# Patient Record
Sex: FEMALE | Race: WHITE | NOT HISPANIC OR LATINO | URBAN - METROPOLITAN AREA
[De-identification: names, ages, dates, MRNs, and addresses within clinical notes are randomized per-mention and may not be internally consistent; named-entity substitution may affect disease eponyms.]

---

## 2023-06-06 LAB
BASOPHILS (10*3/UL) IN BLOOD BY AUTOMATED COUNT: 0.05 X10E9/L (ref 0–0.1)
BASOPHILS/100 LEUKOCYTES IN BLOOD BY AUTOMATED COUNT: 0.7 % (ref 0–2)
EOSINOPHILS (10*3/UL) IN BLOOD BY AUTOMATED COUNT: 0.08 X10E9/L (ref 0–0.7)
EOSINOPHILS/100 LEUKOCYTES IN BLOOD BY AUTOMATED COUNT: 1.1 % (ref 0–6)
ERYTHROCYTE DISTRIBUTION WIDTH (RATIO) BY AUTOMATED COUNT: 12.7 % (ref 11.5–14.5)
ERYTHROCYTE MEAN CORPUSCULAR HEMOGLOBIN CONCENTRATION (G/DL) BY AUTOMATED: 32.1 G/DL (ref 32–36)
ERYTHROCYTE MEAN CORPUSCULAR VOLUME (FL) BY AUTOMATED COUNT: 94 FL (ref 80–100)
ERYTHROCYTES (10*6/UL) IN BLOOD BY AUTOMATED COUNT: 4.89 X10E12/L (ref 4–5.2)
HEMATOCRIT (%) IN BLOOD BY AUTOMATED COUNT: 45.8 % (ref 36–46)
HEMOGLOBIN (G/DL) IN BLOOD: 14.7 G/DL (ref 12–16)
IMMATURE GRANULOCYTES/100 LEUKOCYTES IN BLOOD BY AUTOMATED COUNT: 0.4 % (ref 0–0.9)
LEUKOCYTES (10*3/UL) IN BLOOD BY AUTOMATED COUNT: 7 X10E9/L (ref 4.4–11.3)
LYMPHOCYTES (10*3/UL) IN BLOOD BY AUTOMATED COUNT: 1.37 X10E9/L (ref 1.2–4.8)
LYMPHOCYTES/100 LEUKOCYTES IN BLOOD BY AUTOMATED COUNT: 19.5 % (ref 13–44)
MONOCYTES (10*3/UL) IN BLOOD BY AUTOMATED COUNT: 0.53 X10E9/L (ref 0.1–1)
MONOCYTES/100 LEUKOCYTES IN BLOOD BY AUTOMATED COUNT: 7.6 % (ref 2–10)
NEUTROPHILS (10*3/UL) IN BLOOD BY AUTOMATED COUNT: 4.95 X10E9/L (ref 1.2–7.7)
NEUTROPHILS/100 LEUKOCYTES IN BLOOD BY AUTOMATED COUNT: 70.7 % (ref 40–80)
PLATELETS (10*3/UL) IN BLOOD AUTOMATED COUNT: 300 X10E9/L (ref 150–450)

## 2023-06-14 ENCOUNTER — HOSPITAL ENCOUNTER (OUTPATIENT)
Dept: DATA CONVERSION | Facility: HOSPITAL | Age: 53
End: 2023-06-14
Attending: ORTHOPAEDIC SURGERY | Admitting: ORTHOPAEDIC SURGERY
Payer: COMMERCIAL

## 2023-06-14 DIAGNOSIS — I11.0 HYPERTENSIVE HEART DISEASE WITH HEART FAILURE (MULTI): ICD-10-CM

## 2023-06-14 DIAGNOSIS — I50.9 HEART FAILURE, UNSPECIFIED (MULTI): ICD-10-CM

## 2023-06-14 DIAGNOSIS — Z85.3 PERSONAL HISTORY OF MALIGNANT NEOPLASM OF BREAST: ICD-10-CM

## 2023-06-14 DIAGNOSIS — Z79.82 LONG TERM (CURRENT) USE OF ASPIRIN: ICD-10-CM

## 2023-06-14 DIAGNOSIS — Z88.0 ALLERGY STATUS TO PENICILLIN: ICD-10-CM

## 2023-06-14 DIAGNOSIS — M17.11 UNILATERAL PRIMARY OSTEOARTHRITIS, RIGHT KNEE: ICD-10-CM

## 2023-06-14 DIAGNOSIS — S83.241A OTHER TEAR OF MEDIAL MENISCUS, CURRENT INJURY, RIGHT KNEE, INITIAL ENCOUNTER: ICD-10-CM

## 2023-09-07 VITALS — HEIGHT: 63 IN | BODY MASS INDEX: 22.66 KG/M2 | WEIGHT: 127.87 LBS

## 2023-09-26 PROBLEM — E03.8 HYPOTHYROIDISM DUE TO HASHIMOTO'S THYROIDITIS: Status: ACTIVE | Noted: 2020-09-23

## 2023-09-26 PROBLEM — D05.01 LOBULAR CARCINOMA IN SITU (LCIS) OF RIGHT BREAST: Status: ACTIVE | Noted: 2020-06-05

## 2023-09-26 PROBLEM — Z95.810 USES LIFEVEST DEFIBRILLATOR: Status: ACTIVE | Noted: 2021-04-30

## 2023-09-26 PROBLEM — T78.40XA ALLERGIES: Status: ACTIVE | Noted: 2023-09-26

## 2023-09-26 PROBLEM — D05.02 LOBULAR CARCINOMA IN SITU (LCIS) OF LEFT BREAST: Status: ACTIVE | Noted: 2023-09-26

## 2023-09-26 PROBLEM — N60.92 ATYPICAL LOBULAR HYPERPLASIA (ALH) OF LEFT BREAST: Status: ACTIVE | Noted: 2023-09-26

## 2023-09-26 PROBLEM — D05.00 BREAST NEOPLASM, TIS (LCIS): Status: ACTIVE | Noted: 2021-06-28

## 2023-09-26 PROBLEM — I50.22 CHRONIC SYSTOLIC (CONGESTIVE) HEART FAILURE (MULTI): Status: ACTIVE | Noted: 2022-11-09

## 2023-09-26 PROBLEM — E03.9 HYPOTHYROIDISM: Status: ACTIVE | Noted: 2023-09-26

## 2023-09-26 PROBLEM — R05.9 COUGH: Status: ACTIVE | Noted: 2023-09-26

## 2023-09-26 PROBLEM — Z86.16 HISTORY OF 2019 NOVEL CORONAVIRUS DISEASE (COVID-19): Status: ACTIVE | Noted: 2021-08-02

## 2023-09-26 PROBLEM — I50.9 HEART FAILURE (MULTI): Status: ACTIVE | Noted: 2023-09-26

## 2023-09-26 PROBLEM — J30.9 ALLERGIC RHINITIS: Status: ACTIVE | Noted: 2023-09-26

## 2023-09-26 PROBLEM — I42.8 NICM (NONISCHEMIC CARDIOMYOPATHY) (MULTI): Status: ACTIVE | Noted: 2021-02-01

## 2023-09-26 PROBLEM — E06.3 HYPOTHYROIDISM DUE TO HASHIMOTO'S THYROIDITIS: Status: ACTIVE | Noted: 2020-09-23

## 2023-09-26 PROBLEM — C50.911 MALIGNANT NEOPLASM OF RIGHT BREAST (MULTI): Status: ACTIVE | Noted: 2020-12-24

## 2023-09-26 RX ORDER — BUDESONIDE 0.5 MG/2ML
INHALANT ORAL
COMMUNITY
Start: 2018-12-20 | End: 2023-11-08 | Stop reason: WASHOUT

## 2023-09-26 RX ORDER — METAXALONE 800 MG/1
TABLET ORAL
COMMUNITY
Start: 2023-02-18 | End: 2023-11-08 | Stop reason: WASHOUT

## 2023-09-26 RX ORDER — DUPILUMAB 300 MG/2ML
INJECTION, SOLUTION SUBCUTANEOUS
COMMUNITY
Start: 2021-06-03 | End: 2023-11-08 | Stop reason: WASHOUT

## 2023-09-26 RX ORDER — LEVOTHYROXINE SODIUM 50 UG/1
TABLET ORAL
COMMUNITY
Start: 2020-10-07

## 2023-09-26 RX ORDER — AZELASTINE HYDROCHLORIDE 0.5 MG/ML
SOLUTION/ DROPS OPHTHALMIC
COMMUNITY
Start: 2023-05-19 | End: 2023-11-08 | Stop reason: WASHOUT

## 2023-09-26 RX ORDER — AZELASTINE 1 MG/ML
1 SPRAY, METERED NASAL DAILY PRN
COMMUNITY
Start: 2023-07-30 | End: 2023-11-02 | Stop reason: ALTCHOICE

## 2023-09-26 RX ORDER — TRAMADOL HYDROCHLORIDE 50 MG/1
TABLET ORAL EVERY 6 HOURS PRN
COMMUNITY
Start: 2018-01-25 | End: 2023-11-02 | Stop reason: ALTCHOICE

## 2023-09-26 RX ORDER — SPIRONOLACTONE 25 MG/1
25 TABLET ORAL
COMMUNITY
Start: 2021-04-30

## 2023-09-26 RX ORDER — ROSUVASTATIN CALCIUM 10 MG/1
10 TABLET, COATED ORAL
COMMUNITY
Start: 2022-11-09 | End: 2023-11-09

## 2023-09-26 RX ORDER — FUROSEMIDE 20 MG/1
20 TABLET ORAL
COMMUNITY
Start: 2023-02-22 | End: 2024-02-22

## 2023-09-26 RX ORDER — ONDANSETRON 4 MG/1
TABLET, FILM COATED ORAL
COMMUNITY
Start: 2023-06-14 | End: 2023-11-08 | Stop reason: WASHOUT

## 2023-09-26 RX ORDER — TAMOXIFEN CITRATE 10 MG/1
1 TABLET ORAL DAILY
COMMUNITY
End: 2023-11-02 | Stop reason: ALTCHOICE

## 2023-09-26 RX ORDER — METOPROLOL SUCCINATE 50 MG/1
TABLET, EXTENDED RELEASE ORAL
COMMUNITY
Start: 2023-01-22 | End: 2023-11-08

## 2023-09-26 RX ORDER — DOXYCYCLINE 40 MG/1
CAPSULE ORAL
COMMUNITY
End: 2023-11-08 | Stop reason: WASHOUT

## 2023-09-26 RX ORDER — MONTELUKAST SODIUM 10 MG/1
10 TABLET ORAL
COMMUNITY
Start: 2015-01-23

## 2023-09-26 RX ORDER — KETOROLAC TROMETHAMINE 10 MG/1
TABLET, FILM COATED ORAL
COMMUNITY
Start: 2023-06-14 | End: 2023-11-02 | Stop reason: ALTCHOICE

## 2023-09-26 RX ORDER — DOCUSATE SODIUM 100 MG/1
100 CAPSULE, LIQUID FILLED ORAL 2 TIMES DAILY PRN
COMMUNITY
Start: 2023-06-14 | End: 2023-11-08 | Stop reason: WASHOUT

## 2023-09-26 RX ORDER — DUPILUMAB 300 MG/2ML
300 INJECTION, SOLUTION SUBCUTANEOUS
COMMUNITY
Start: 2020-12-29

## 2023-09-26 RX ORDER — ASPIRIN 81 MG/1
TABLET ORAL
COMMUNITY
Start: 2023-06-14 | End: 2023-11-08 | Stop reason: WASHOUT

## 2023-09-26 RX ORDER — OXYCODONE AND ACETAMINOPHEN 5; 325 MG/1; MG/1
1 TABLET ORAL EVERY 6 HOURS PRN
COMMUNITY
Start: 2023-06-14 | End: 2023-11-02 | Stop reason: ALTCHOICE

## 2023-09-26 RX ORDER — DAPAGLIFLOZIN 10 MG/1
1 TABLET, FILM COATED ORAL
COMMUNITY
Start: 2023-05-09

## 2023-09-26 RX ORDER — DAPAGLIFLOZIN 5 MG/1
TABLET, FILM COATED ORAL
COMMUNITY
Start: 2021-07-14 | End: 2023-11-08 | Stop reason: WASHOUT

## 2023-09-26 RX ORDER — AZELAIC ACID 0.15 G/G
GEL TOPICAL 2 TIMES DAILY
COMMUNITY
Start: 2023-05-03 | End: 2023-11-02 | Stop reason: ALTCHOICE

## 2023-09-26 RX ORDER — SODIUM CHLORIDE 0.9 % (FLUSH) 0.9 %
10 SYRINGE (ML) INJECTION
COMMUNITY
Start: 2022-11-09 | End: 2023-11-02 | Stop reason: ALTCHOICE

## 2023-09-26 RX ORDER — METOPROLOL SUCCINATE 100 MG/1
100 TABLET, EXTENDED RELEASE ORAL
COMMUNITY
Start: 2021-05-14 | End: 2024-07-24

## 2023-09-30 NOTE — H&P
History & Physical Reviewed:   Pregnant/Lactating:  ·  Are You Pregnant no   ·  Are You Currently Breastfeeding no     I have reviewed the History and Physical dated:  22-Jun-2023   History and Physical reviewed and relevant findings noted. Patient examined to review pertinent physical  findings.: No significant changes   Home Medications Reviewed: no changes noted   Allergies Reviewed: no changes noted       ERAS (Enhanced Recovery After Surgery):  ·  ERAS Patient: no     Consent:   COVID-19 Consent:  ·  COVID-19 Risk Consent Surgeon has reviewed key risks related to the risk of debra COVID-19 and if they contract COVID-19 what the risks are.       Electronic Signatures:  Emmett Gibson)  (Signed 17-Jul-2023 13:04)   Authored: History & Physical Reviewed, ERAS, Consent,  Note Completion      Last Updated: 17-Jul-2023 13:04 by Emmett Gibson)

## 2023-10-02 NOTE — OP NOTE
Post Operative Note:     PreOp Diagnosis: medial meniscus tear   Post-Procedure Diagnosis: same plus arthritis   Procedure: 1. arthroscopic medial menisectomy  2. arthroscopic chondroplasty  3. arthroscopic micro fracture  4.   5.   Surgeon: janet   Resident/Fellow/Other Assistant: Cally   Estimated Blood Loss (mL): none   Specimen: no   Findings: focal grade four chondral defrct [facility]       Electronic Signatures:  Emmett Gibson)  (Signed 14-Jun-2023 12:37)   Authored: Post Operative Note, Note Completion      Last Updated: 14-Jun-2023 12:37 by Emmett Gibson)

## 2023-10-19 ENCOUNTER — ANCILLARY PROCEDURE (OUTPATIENT)
Dept: RADIOLOGY | Facility: CLINIC | Age: 53
End: 2023-10-19
Payer: COMMERCIAL

## 2023-10-19 DIAGNOSIS — Z12.31 ENCOUNTER FOR SCREENING MAMMOGRAM FOR MALIGNANT NEOPLASM OF BREAST: ICD-10-CM

## 2023-10-19 PROCEDURE — 77067 SCR MAMMO BI INCL CAD: CPT | Mod: 50

## 2023-10-19 PROCEDURE — 77067 SCR MAMMO BI INCL CAD: CPT | Mod: BILATERAL PROCEDURE | Performed by: RADIOLOGY

## 2023-10-19 PROCEDURE — 77063 BREAST TOMOSYNTHESIS BI: CPT | Mod: BILATERAL PROCEDURE | Performed by: RADIOLOGY

## 2023-10-24 ENCOUNTER — TELEPHONE (OUTPATIENT)
Dept: ORTHOPEDIC SURGERY | Facility: CLINIC | Age: 53
End: 2023-10-24
Payer: COMMERCIAL

## 2023-10-24 NOTE — TELEPHONE ENCOUNTER
Patient called in advised she had surgery on June 14 and she's still having trouble with her knee. Advised patient she soul make an appointment with Dr. Gibson to discuss her concerns and issues with her knee

## 2023-10-25 DIAGNOSIS — Z12.31 VISIT FOR SCREENING MAMMOGRAM: ICD-10-CM

## 2023-10-25 NOTE — PROGRESS NOTES
Jennifer Padilla female   1970 53 y.o.   [id number]      Chief Complaint  High risk surveillance care, annual mammogram and exam    History Of Present Illness  Jennifer Padilla is a very pleasant 53 y.o.  female [facility]. She has a history of right breast lobular carcinoma in situ (LCIS in 2015 and left breast atypical lobular hyperplasia (ALH) in 2017. Both of these are were surgically excised. She took Tamoxifen for five years (7264-1059). She has family history of breast cancer in her mother, age 57 with recurrence age 70.     BREAST IMAGING: 10/19/2023 Bilateral screening mammogram, indicates BI-RADS Category 2. 11/10/2021 Bilateral Full MRI, indicates BI-RADS Category 2.     REPRODUCTIVE HISTORY: menarche age 12, , first birth age 33,  x 1 year, OCP's x 20-30 years, natural menopause age 52, no HRT, heterogeneously dense tissue    FAMILY CANCER HISTORY:   Mother: Breast cancer, age 57 with recurrence age 70, alive at 76     Surgical History  She has a past surgical history that includes Other surgical history (10/07/2020); Other surgical history (10/07/2020); Other surgical history (10/07/2020); BI mammo guided breast left localization (Left, 2018); and Breast lumpectomy (Bilateral).     Social History  She reports that she has never smoked. She has never used smokeless tobacco. She reports that she does not currently use alcohol. She reports that she does not use drugs.    Family History  Family History   Problem Relation Name Age of Onset    Breast cancer Mother          Allergies  Penicillins    Medications  Current Outpatient Medications   Medication Instructions    aspirin 81 mg EC tablet TAKE 1 TABLET BY MOUTH 2 TIMES A DAY. TAKE WITH FOOD AND A GLASS OF WATER.    azelaic acid (Finacea) 15 % gel Topical, 2 times daily, to affected area    azelastine (Astelin) 137 mcg (0.1 %) nasal spray 1 spray, Does not apply,  Daily PRN    azelastine (Optivar) 0.05 % ophthalmic solution     beclomethasone dipropionate (Qvar) 80 mcg/actuation inhaler nasal    budesonide (Pulmicort) 0.5 mg/2 mL nebulizer solution inhalation    dapagliflozin propanediol (Farxiga) 10 mg 1 tablet, oral, Daily with breakfast    dapagliflozin propanediol (Farxiga) 5 mg oral    docusate sodium (COLACE) 100 mg, oral, 2 times daily PRN    doxycycline (Oracea) 40 mg DR capsule oral    Dupixent Pen 300 mg, subcutaneous, Every 14 days    Dupixent Syringe 300 mg/2 mL syringe injection subcutaneous    furosemide (LASIX) 20 mg, oral    ketorolac (Toradol) 10 mg tablet TAKE 1 TABLET BY MOUTH 3 TIMES A DAY FOR THREE DAYS. TAKE WITH FOOD.    levothyroxine (Synthroid, Levoxyl) 50 mcg tablet TAKE 1 TABLET DAILY FOR FIVE DAYS A WEEK AND TAKE ONE AND ONE-HALF TABLETS TWO DAYS A WEEK    metaxalone (Skelaxin) 800 mg tablet TAKE 1 TABLET BY MOUTH EVERY 6 HOURS AS NEEDED FOR PAIN FOR UP TO 3 DAYS.    metoprolol succinate XL (Toprol-XL) 50 mg 24 hr tablet     metoprolol succinate XL (TOPROL-XL) 100 mg, oral, Daily RT    montelukast (SINGULAIR) 10 mg, oral, Daily RT    multivit with minerals/lutein (MULTIVITAMIN 50 PLUS ORAL) once a day    ondansetron (Zofran) 4 mg tablet TAKE 1 TABLET BY MOUTH EVERY 8 HOURS AS NEEDED FOR NAUSEAU    oxyCODONE-acetaminophen (Percocet) 5-325 mg tablet 1 tablet, oral, Every 6 hours PRN    rosuvastatin (CRESTOR) 10 mg, oral, Daily RT    sacubitriL-valsartan (Entresto) 24-26 mg tablet 0.5 tablets, oral, 2 times daily    sodium chloride 0.9% (sodium chloride) flush 10 mL, intravenous    spironolactone (ALDACTONE) 25 mg, oral    tamoxifen (Nolvadex) 10 mg tablet 1 tablet, oral, Daily    traMADol (Ultram) 50 mg tablet oral, Every 6 hours PRN         REVIEW OF SYSTEMS    Constitutional:  Negative for appetite change, fatigue, fever and unexpected weight change.   HENT:  Negative for ear pain, hearing loss, nosebleeds, sore throat and trouble swallowing.     Eyes:  Negative for discharge, itching and visual disturbance.   Respiratory:  Negative for cough, chest tightness and shortness of breath.    Cardiovascular:  Negative for chest pain, palpitations and leg swelling.   Breast: as indicated in HPI  Gastrointestinal:  Negative for abdominal pain, constipation, diarrhea and nausea.   Endocrine: Negative for cold intolerance and heat intolerance.   Genitourinary:  Negative for dysuria, frequency, hematuria, pelvic pain and vaginal bleeding.   Musculoskeletal:  Negative for arthralgias, back pain, gait problem, joint swelling and myalgias.   Skin:  Negative for color change and rash.   Allergic/Immunologic: Negative for environmental allergies and food allergies.   Neurological:  Negative for dizziness, tremors, speech difficulty, weakness, numbness and headaches.   Hematological:  Does not bruise/bleed easily.   Psychiatric/Behavioral:  Negative for agitation, dysphoric mood and sleep disturbance. The patient is not nervous/anxious.         Past Medical History  She has a past medical history of Other abnormal and inconclusive findings on diagnostic imaging of breast (12/21/2018) and Personal history of other specified conditions (08/03/2019).     Physical Exam  Patient is alert and oriented x3 and in a relaxed and appropriate mood. Her gait is steady and hand grasps are equal. Sclera is clear. The breasts are full and nearly symmetrical. The tissue is dense in the superior lateral quadrants, right more dense than left without palpable abnormalities, discrete nodules or masses. Both breasts have well-healed excisional biopsy incisions. The skin and nipples appear normal. There is no cervical, supraclavicular or axillary lymphadenopathy. Heart rate and rhythm normal, S1 and S2 appreciated. The lungs are clear to auscultation bilaterally. Abdomen is soft and non-tender.       Physical Exam  Chest:            Last Recorded Vitals  Vitals:    11/02/23 0819   BP: 93/60    Pulse: 64       Relevant Results   Time was spent viewing digital images of the radiology testing with the patient. I explained the results in depth, along with suggested explanation for follow up recommendations based on the testing results. BI-RADS Category 2    Imaging  BI mammo bilateral screening tomosynthesis 10/19/2023    Narrative  Interpreted By:  Chandni Chen,  STUDY:  BI MAMMO BILATERAL SCREENING TOMOSYNTHESIS;  10/19/2023 2:16 pm    ACCESSION NUMBER(S):  [id number]    ORDERING CLINICIAN:  JUAN J DONATO    INDICATION:  Screening. Family history of breast cancer with her mother diagnosed.  History of a left excisional for LCIS. History of a right excision  for atypia.    COMPARISON:  10/14/2022, 09/08/2021, 08/03/2020, 07/25/2018, 06/29/2016    FINDINGS:  2D and tomosynthesis images were reviewed at 1 mm slice thickness.    Density:  The breast tissue is heterogeneously dense, which may  obscure small masses.    No suspicious masses or calcifications are identified.    Impression  No mammographic evidence of malignancy.    BI-RADS CATEGORY:    BI-RADS Category:  2 Benign.  Recommendation:  Routine Screening Mammogram in 1 Year.  Recommended Date:  1 Year.  Laterality:  Bilateral.    For any future breast imaging appointments, please call [phone] ([phone]).      MACRO:  None    Signed by: Chandni Chen 10/24/2023 1:22 PM  Dictation workstation:   [id number]       Assessment/Plan   High risk surveillance care, normal clinical exam and imaging, history of right LCIS, history of left ALH, Tamoxifen therapy completed, family history of breast cancer, heterogeneously dense tissue    Plan: Return in one year for bilateral screening mammogram and office visit. Full MRI in April 2024. May proceed a little sooner if desired.     Patient Discussion/Summary  Your clinical examination and imaging are normal. Please return in one year for bilateral screening mammogram and office visit or sooner if you have any  problems or concerns.     High risk breast surveillance care plan:  Yearly mammogram with digital breast tomosynthesis  Twice yearly clinical breast examinations  Breast MRI - Full MRI in April 2024  Monthly self breast examinations  Vitamin D3 2000 IU/daily (over the counter)  Exercise 3-4 times per week for 45-60 minutes  Limit alcohol to 3-4 drinks per week   Eat a healthy low-fat diet with lots of fruits and vegetables  Risk models indicate personal risk of breast cancer in the next five years and lifetime (age 90)  Evangelist: 5 year risk 11.2% (average 1.4%) and lifetime risk 51.2% (average 8.6%)   Tamoxifen therapy completed. Risks reduced by 50%.     You can see your health information, review clinical summaries from office visits & test results online when you follow your health with MY  Chart, a personal health record. To sign up go to [email or web address]. If you need assistance with signing up or trouble getting into your account call [employer] Patient Line 24/7 at [phone].    My office phone number is [phone] if you need to get in touch with me or have additional questions or concerns. Thank you for choosing [facility] and trusting me as your healthcare provider. I look forward to seeing you again at your next office visit. I am honored to be a provider on your health care team and I remain dedicated to helping you achieve your health goals.      Mary Lutz, APRN-CNP

## 2023-10-27 ENCOUNTER — APPOINTMENT (OUTPATIENT)
Dept: SURGICAL ONCOLOGY | Facility: CLINIC | Age: 53
End: 2023-10-27
Payer: COMMERCIAL

## 2023-11-02 ENCOUNTER — OFFICE VISIT (OUTPATIENT)
Dept: SURGICAL ONCOLOGY | Facility: CLINIC | Age: 53
End: 2023-11-02
Payer: COMMERCIAL

## 2023-11-02 VITALS
SYSTOLIC BLOOD PRESSURE: 93 MMHG | HEART RATE: 64 BPM | BODY MASS INDEX: 22.87 KG/M2 | HEIGHT: 63 IN | WEIGHT: 129.08 LBS | DIASTOLIC BLOOD PRESSURE: 60 MMHG

## 2023-11-02 DIAGNOSIS — R92.30 DENSE BREAST TISSUE: ICD-10-CM

## 2023-11-02 DIAGNOSIS — Z12.39 BREAST CANCER SCREENING, HIGH RISK PATIENT: Primary | ICD-10-CM

## 2023-11-02 PROCEDURE — 99214 OFFICE O/P EST MOD 30 MIN: CPT | Performed by: NURSE PRACTITIONER

## 2023-11-02 ASSESSMENT — PAIN SCALES - GENERAL: PAINLEVEL: 0-NO PAIN

## 2023-11-02 NOTE — PATIENT INSTRUCTIONS
Your clinical examination and imaging are normal. Please return in one year for bilateral screening mammogram and office visit or sooner if you have any problems or concerns.     High risk breast surveillance care plan:  Yearly mammogram with digital breast tomosynthesis  Twice yearly clinical breast examinations  Breast MRI - Full MRI in   Monthly self breast examinations  Vitamin D3 2000 IU/daily (over the counter)  Exercise 3-4 times per week for 45-60 minutes  Limit alcohol to 3-4 drinks per week   Eat a healthy low-fat diet with lots of fruits and vegetables  Risk models indicate personal risk of breast cancer in the next five years and lifetime (age 90)  Evangelist: 5 year risk 11.2% (average 1.4%) and lifetime risk 51.2% (average 8.6%)   Tamoxifen therapy completed. Risks reduced by 50%.     You can see your health information, review clinical summaries from office visits & test results online when you follow your health with MY  Chart, a personal health record. To sign up go to [email or web address]. If you need assistance with signing up or trouble getting into your account call [employer] Patient Line 24/7 at [phone].    My office phone number is [phone] if you need to get in touch with me or have additional questions or concerns. Thank you for choosing [facility] and trusting me as your healthcare provider. I look forward to seeing you again at your next office visit. I am honored to be a provider on your health care team and I remain dedicated to helping you achieve your health goals.

## 2023-11-08 ENCOUNTER — OFFICE VISIT (OUTPATIENT)
Dept: OBSTETRICS AND GYNECOLOGY | Facility: CLINIC | Age: 53
End: 2023-11-08
Payer: COMMERCIAL

## 2023-11-08 VITALS
SYSTOLIC BLOOD PRESSURE: 100 MMHG | WEIGHT: 131 LBS | BODY MASS INDEX: 23.21 KG/M2 | HEIGHT: 63 IN | DIASTOLIC BLOOD PRESSURE: 48 MMHG

## 2023-11-08 DIAGNOSIS — Z01.419 WELL WOMAN EXAM WITH ROUTINE GYNECOLOGICAL EXAM: ICD-10-CM

## 2023-11-08 PROCEDURE — 1036F TOBACCO NON-USER: CPT | Performed by: OBSTETRICS & GYNECOLOGY

## 2023-11-08 PROCEDURE — 87624 HPV HI-RISK TYP POOLED RSLT: CPT

## 2023-11-08 PROCEDURE — 88141 CYTOPATH C/V INTERPRET: CPT | Performed by: STUDENT IN AN ORGANIZED HEALTH CARE EDUCATION/TRAINING PROGRAM

## 2023-11-08 PROCEDURE — 88175 CYTOPATH C/V AUTO FLUID REDO: CPT

## 2023-11-08 PROCEDURE — 99396 PREV VISIT EST AGE 40-64: CPT | Performed by: OBSTETRICS & GYNECOLOGY

## 2023-11-08 NOTE — PROGRESS NOTES
Subjective   Patient ID: Jennifer Padilla is a 53 y.o. female who presents for Well Women Visit (Annual exam with pap smear/- mammogram orders from Dr Corado//No complaints//Chaperone declined).  HPI  As contained in the chief complaint.  Jennifer is on a schedule of annual mammogram and biannual MRIs for surveillance of LCIS  Review of Systems  10 symptoms have been reviewed and are negative and noncontributory to the patient current ailments.    Objective   Physical Exam  Vital signs reviewed    CONSTITUTIONAL- well nourished, well developed, looks like stated age.  She is sitting comfortably on the exam table in no apparent distress  SKIN- normal skin color and pigmentation no visible lesions  EYES- normal external exam  THYROID- symmetrical ,normal size and normal consistency  HEART- RRR without murmur, S3 or S4.  LUNGS- breathing comfortably, no dyspnea  EXTREMITIES- no deformities  NEUROLOGICAL- oriented and no focal signs.  Cranial nerves II through XII intact  PSYCHIATRIC- alert, pleasant and cordial, age-appropriate, not anxious, or depressed appearing  BREASTS-normal appearance, no skin changes or nipple discharge.  Palpation of the breast and axillae: No palpable mass and no axillary lymphadenopathy  ABDOMEN- soft, non distended, bowel sound normal pitch and intensity,no palpable abnormal masses  GENITOURINARY- External genitalia: Normal. No inguinal lymphadenopathy. Bartholin's Urethral and Charlo's Glands: Normal .                                  - Uterus: AV/AF NSSC, mobile and nontender                                -The adnexal areas are free of tenderness or mass                                -There are no cervical lesions; there is no cervical motion tenderness                                -Vagina without lesions, mucosa pink and well-hydrated, discharge is physiologic.                                   Inspection of Perianal Area: Normal    Assessment/Plan   Diagnoses and all orders for this  visit:  Well woman exam with routine gynecological exam  -Pap smear obtained  -Colonoscopy up-to-date

## 2024-04-22 ENCOUNTER — HOSPITAL ENCOUNTER (OUTPATIENT)
Dept: RADIOLOGY | Facility: HOSPITAL | Age: 54
Discharge: HOME | End: 2024-04-22
Payer: COMMERCIAL

## 2024-04-22 DIAGNOSIS — Z12.39 BREAST CANCER SCREENING, HIGH RISK PATIENT: ICD-10-CM

## 2024-04-22 DIAGNOSIS — R92.30 DENSE BREAST TISSUE: ICD-10-CM

## 2024-04-22 PROCEDURE — 2550000001 HC RX 255 CONTRASTS: Performed by: NURSE PRACTITIONER

## 2024-04-22 PROCEDURE — A9575 INJ GADOTERATE MEGLUMI 0.1ML: HCPCS | Performed by: NURSE PRACTITIONER

## 2024-04-22 PROCEDURE — 77049 MRI BREAST C-+ W/CAD BI: CPT | Performed by: STUDENT IN AN ORGANIZED HEALTH CARE EDUCATION/TRAINING PROGRAM

## 2024-04-22 PROCEDURE — 77049 MRI BREAST C-+ W/CAD BI: CPT

## 2024-04-22 RX ORDER — GADOTERATE MEGLUMINE 376.9 MG/ML
11 INJECTION INTRAVENOUS
Status: COMPLETED | OUTPATIENT
Start: 2024-04-22 | End: 2024-04-22

## 2024-04-22 RX ADMIN — GADOTERATE MEGLUMINE 11 ML: 376.9 INJECTION INTRAVENOUS at 08:43

## 2024-05-06 NOTE — OP NOTE
PREOPERATIVE DIAGNOSIS:    POSTOPERATIVE DIAGNOSIS:    OPERATION/PROCEDURE:  1. Arthroscopic medial meniscectomy.  2. Arthroscopic microfracture.  3. Arthroscopic chondroplasty.    SURGEON:  Emmett Gibson MD.    ASSISTANT(S):  Keri.    ANESTHESIA:  General.    DIAGNOSES:  1. Medial meniscus tear, right knee.  2. Arthritis, right knee.    COMPLICATIONS:  None.    DESCRIPTION OF PROCEDURE:  A preoperative Huddle was performed with patient identification,  procedure, and site verification.  The patient was given prophylactic  antibiotics, placed under general anesthesia.  The right leg was  sterilely prepped and draped.  Leg tourniquets bilaterally were  utilized.  2 portals were established, both medial.  Inspection of  lateral joint line revealed intact articular surfaces and meniscal  surfaces.  The notch was intact as well as patellofemoral area.  Inspection of medial joint revealed a parrot-beak tear at the  junction of the body and posterior horn of the medial meniscus.  This  was excised back to a balanced stable rim, requiring resection of  approximately 20% to 30% of the meniscus.  There was a full-thickness  chondral lesion, approximately dime-sized in diameter of the medial  femoral condyle.  A chondroplasty was performed of loose chondral  surface down to expose bone.  Next, using a 0.062 K-wire,  microfracture was performed using multiple drill holes.  The  arthroscopic equipment was removed.  The portals were closed.  The  knee was locally injected with lidocaine and Duramorph.  A sterile  dressing applied, and the patient was taken to Recovery, having  tolerated the procedure well.       Emmett Gibson MD    DD:  06/14/2023 12:41:48 EST  DT:  06/14/2023 13:43:33 EST  DICTATION NUMBER:  [id number]  INTERNAL JOB NUMBER:  [id number]    CC:  Emmett Gibson MD        Electronic Signatures:  Emmett Gibson (MD) (Signed on 22-Jun-2023 10:20)   Authored  Unsigned, Draft  (SYS GENERATED) (Entered on 14-Jun-2023 13:43)   Entered    Last Updated: 22-Jun-2023 10:20 by Emmett Gibson)

## 2024-10-01 NOTE — PROGRESS NOTES
Jennifer Padilla female   1970 54 y.o.   [id number]      Chief Complaint  High risk surveillance care, annual mammogram and exam    History Of Present Illness  Jennifer Padilla is a very pleasant 54 y.o.  female [facility]. She has a history of right breast lobular carcinoma in situ (LCIS in 2015 and left breast atypical lobular hyperplasia (ALH) in 2017. Both of these are were surgically excised. She took Tamoxifen for five years ([id number]). She has family history of breast cancer in her mother, age 57 with recurrence age 70.     BREAST IMAGIN2024 Bilateral Full MRI, indicates BI-RADS Category 1. 10/19/2023 Bilateral screening mammogram, indicates BI-RADS Category 2.     REPRODUCTIVE HISTORY: menarche age 12, , first birth age 33,  x 1 year, OCP's x 20-30 years, natural menopause age 52, no HRT, heterogeneously dense tissue    FAMILY CANCER HISTORY:   Mother: Breast cancer, age 57 with recurrence age 70, alive at 76     Surgical History  She has a past surgical history that includes Other surgical history (10/07/2020); Other surgical history (10/07/2020); Other surgical history (10/07/2020); BI mammo guided breast left localization (Left, 2018); and Breast lumpectomy (Bilateral).     Social History  She reports that she has never smoked. She has never used smokeless tobacco. She reports that she does not currently use alcohol. She reports that she does not use drugs.    Family History  Family History   Problem Relation Name Age of Onset    Breast cancer Mother          Allergies  Penicillins    Medications  Current Outpatient Medications   Medication Instructions    dapagliflozin propanediol (Farxiga) 10 mg 1 tablet, oral, Daily with breakfast    Dupixent Pen 300 mg, subcutaneous, Every 14 days    furosemide (LASIX) 20 mg, oral    levothyroxine (Synthroid, Levoxyl) 50 mcg tablet TAKE 1 TABLET DAILY FOR FIVE DAYS A  WEEK AND TAKE ONE AND ONE-HALF TABLETS TWO DAYS A WEEK    metoprolol succinate XL (TOPROL-XL) 100 mg, oral, Daily RT    montelukast (SINGULAIR) 10 mg, oral, Daily RT    rosuvastatin (CRESTOR) 10 mg, oral, Daily RT    sacubitriL-valsartan (Entresto) 24-26 mg tablet 0.5 tablets, oral, 2 times daily    spironolactone (ALDACTONE) 25 mg, oral         REVIEW OF SYSTEMS    Constitutional:  Negative for appetite change, fatigue, fever and unexpected weight change.   HENT:  Negative for ear pain, hearing loss, nosebleeds, sore throat and trouble swallowing.    Eyes:  Negative for discharge, itching and visual disturbance.   Respiratory:  Negative for cough, chest tightness and shortness of breath.    Cardiovascular:  Negative for chest pain, palpitations and leg swelling.   Breast: as indicated in HPI  Gastrointestinal:  Negative for abdominal pain, constipation, diarrhea and nausea.   Endocrine: Negative for cold intolerance and heat intolerance.   Genitourinary:  Negative for dysuria, frequency, hematuria, pelvic pain and vaginal bleeding.   Musculoskeletal:  Negative for arthralgias, back pain, gait problem, joint swelling and myalgias.   Skin:  Negative for color change and rash.   Allergic/Immunologic: Negative for environmental allergies and food allergies.   Neurological:  Negative for dizziness, tremors, speech difficulty, weakness, numbness and headaches.   Hematological:  Does not bruise/bleed easily.   Psychiatric/Behavioral:  Negative for agitation, dysphoric mood and sleep disturbance. The patient is not nervous/anxious.         Past Medical History  She has a past medical history of Allergies, Atypical lobular hyperplasia (ALH) of breast, Hypothyroid, Lateral epicondylitis (tennis elbow), and Lobular carcinoma in situ (LCIS) of both breasts.     Physical Exam  Patient is alert and oriented x3 and in a relaxed and appropriate mood. Her gait is steady and hand grasps are equal. Sclera is clear. The breasts are  full and nearly symmetrical. The tissue is dense in the superior lateral quadrants, right more dense than left without palpable abnormalities, discrete nodules or masses. Both breasts have well-healed excisional biopsy incisions. The skin and nipples appear normal. There is no cervical, supraclavicular or axillary lymphadenopathy. Heart rate and rhythm normal, S1 and S2 appreciated. The lungs are clear to auscultation bilaterally. Abdomen is soft and non-tender.       Physical Exam  Chest:              Last Recorded Vitals  There were no vitals filed for this visit.      Relevant Results   Time was spent viewing digital images of the radiology testing with the patient. I explained the results in depth, along with suggested explanation for follow up recommendations based on the testing results. BI-RADS Category     Imaging     Assessment/Plan   High risk surveillance care, normal clinical exam and imaging, history of right LCIS, history of left ALH, Tamoxifen therapy completed, family history of breast cancer, heterogeneously dense tissue    Plan: Return in one year for bilateral screening mammogram and office visit. Full MRI in April 2024. May proceed a little sooner if desired.     Patient Discussion/Summary  Your clinical examination and imaging are normal. Please return in one year for bilateral screening mammogram and office visit or sooner if you have any problems or concerns.     High risk breast surveillance care plan:  Yearly mammogram with digital breast tomosynthesis  Twice yearly clinical breast examinations  Breast MRI - Full MRI in April 2025  Monthly self breast examinations  Vitamin D3 2000 IU/daily (over the counter)  Exercise 3-4 times per week for 45-60 minutes  Limit alcohol to 3-4 drinks per week   Eat a healthy low-fat diet with lots of fruits and vegetables    You can see your health information, review clinical summaries from office visits & test results online when you follow your health with  MY  Chart, a personal health record. To sign up go to [email or web address]. If you need assistance with signing up or trouble getting into your account call [employer] Patient Line 24/7 at [phone].    My office phone number is [phone] if you need to get in touch with me or have additional questions or concerns. Thank you for choosing [facility] and trusting me as your healthcare provider. I look forward to seeing you again at your next office visit. I am honored to be a provider on your health care team and I remain dedicated to helping you achieve your health goals.      Mary Lutz, APRN-CNP

## 2024-10-21 ENCOUNTER — HOSPITAL ENCOUNTER (OUTPATIENT)
Dept: RADIOLOGY | Facility: CLINIC | Age: 54
Discharge: HOME | End: 2024-10-21
Payer: COMMERCIAL

## 2024-10-21 ENCOUNTER — APPOINTMENT (OUTPATIENT)
Dept: SURGICAL ONCOLOGY | Facility: CLINIC | Age: 54
End: 2024-10-21
Payer: COMMERCIAL

## 2024-10-21 VITALS — WEIGHT: 125 LBS | HEIGHT: 63 IN | BODY MASS INDEX: 22.15 KG/M2

## 2024-10-21 DIAGNOSIS — Z12.39 BREAST CANCER SCREENING, HIGH RISK PATIENT: ICD-10-CM

## 2024-10-21 PROCEDURE — 77067 SCR MAMMO BI INCL CAD: CPT

## 2024-10-21 PROCEDURE — 77063 BREAST TOMOSYNTHESIS BI: CPT | Performed by: STUDENT IN AN ORGANIZED HEALTH CARE EDUCATION/TRAINING PROGRAM

## 2024-10-21 PROCEDURE — 77067 SCR MAMMO BI INCL CAD: CPT | Performed by: STUDENT IN AN ORGANIZED HEALTH CARE EDUCATION/TRAINING PROGRAM

## 2024-12-11 ENCOUNTER — APPOINTMENT (OUTPATIENT)
Dept: OBSTETRICS AND GYNECOLOGY | Facility: CLINIC | Age: 54
End: 2024-12-11
Payer: COMMERCIAL

## 2024-12-11 VITALS
SYSTOLIC BLOOD PRESSURE: 102 MMHG | DIASTOLIC BLOOD PRESSURE: 62 MMHG | HEIGHT: 63 IN | WEIGHT: 132 LBS | BODY MASS INDEX: 23.39 KG/M2

## 2024-12-11 DIAGNOSIS — Z12.31 BREAST CANCER SCREENING BY MAMMOGRAM: ICD-10-CM

## 2024-12-11 DIAGNOSIS — Z01.419 WELL WOMAN EXAM WITH ROUTINE GYNECOLOGICAL EXAM: ICD-10-CM

## 2024-12-11 PROCEDURE — 1036F TOBACCO NON-USER: CPT | Performed by: OBSTETRICS & GYNECOLOGY

## 2024-12-11 PROCEDURE — 3008F BODY MASS INDEX DOCD: CPT | Performed by: OBSTETRICS & GYNECOLOGY

## 2024-12-11 PROCEDURE — 99396 PREV VISIT EST AGE 40-64: CPT | Performed by: OBSTETRICS & GYNECOLOGY

## 2024-12-11 PROCEDURE — 88175 CYTOPATH C/V AUTO FLUID REDO: CPT

## 2024-12-11 RX ORDER — BUDESONIDE 0.5 MG/2ML
INHALANT ORAL
COMMUNITY
Start: 2024-10-10

## 2024-12-11 RX ORDER — AZELASTINE 1 MG/ML
SPRAY, METERED NASAL
COMMUNITY
Start: 2024-10-10

## 2024-12-11 RX ORDER — DUPILUMAB 300 MG/2ML
INJECTION, SOLUTION SUBCUTANEOUS
COMMUNITY
Start: 2024-11-27

## 2024-12-11 RX ORDER — FUROSEMIDE 40 MG/1
TABLET ORAL
COMMUNITY
Start: 2024-11-18

## 2024-12-11 ASSESSMENT — PAIN SCALES - GENERAL: PAINLEVEL_OUTOF10: 0-NO PAIN

## 2024-12-11 NOTE — PROGRESS NOTES
Subjective   Patient ID: Jennifer Padilla is a 54 y.o. female who presents for Well Women Visit (Annual exam with pap smear/- PCP orders mammograms//No complaints/Chaperone declined).  HPI  As contained in the chief complaint  Review of Systems  10 systems have been reviewed and are negative and noncontributory to the patient current ailments.    Objective   Physical Exam  Vital signs reviewed    CONSTITUTIONAL- well nourished, well developed, looks like stated age.  She is sitting comfortably on the exam table in no apparent distress  SKIN- normal skin color and pigmentation no visible lesions  EYES- normal external exam  THYROID- symmetrical ,normal size and normal consistency  HEART- RRR without murmur, S3 or S4.  LUNGS- breathing comfortably, no dyspnea  EXTREMITIES- no deformities.  Edema, discoloration, or pain in BLE  NEUROLOGICAL- A/Ox3, conversational   PSYCHIATRIC- alert, pleasant and cordial, age-appropriate, not anxious, or depressed appearing  BREASTS-normal appearance, no skin changes or nipple discharge.  Palpation of the breast and axillae: No palpable mass and no axillary lymphadenopathy  ABDOMEN- soft, non distended, bowel sound normal pitch and intensity,no palpable abnormal masses  GENITOURINARY- External genitalia: Normal.                                 - Uterus: AV/AF NSSC, mobile and nontender                                -The adnexal areas are free of tenderness or mass                                -There are no cervical lesions; there is no cervical motion tenderness                                -Vagina without lesions, mucosa pink and well-hydrated, discharge is physiologic.                                   Inspection of Perianal Area: Normal    Assessment/Plan   Diagnoses and all orders for this visit:  Well woman exam with routine gynecological exam  Breast cancer screening by mammogram           Ramón Hager DO 12/11/24 8:53 AM

## 2024-12-24 LAB
CYTOLOGY CMNT CVX/VAG CYTO-IMP: NORMAL
LAB AP HPV GENOTYPE QUESTION: YES
LAB AP HPV HR: NORMAL
LABORATORY COMMENT REPORT: NORMAL
MENSTRUAL HX REPORTED: NORMAL
PATH REPORT.TOTAL CANCER: NORMAL

## 2025-02-18 ENCOUNTER — OFFICE VISIT (OUTPATIENT)
Dept: ORTHOPEDIC SURGERY | Facility: CLINIC | Age: 55
End: 2025-02-18
Payer: COMMERCIAL

## 2025-02-18 ENCOUNTER — HOSPITAL ENCOUNTER (OUTPATIENT)
Dept: RADIOLOGY | Facility: CLINIC | Age: 55
Discharge: HOME | End: 2025-02-18
Payer: COMMERCIAL

## 2025-02-18 DIAGNOSIS — M25.512 LEFT SHOULDER PAIN, UNSPECIFIED CHRONICITY: ICD-10-CM

## 2025-02-18 DIAGNOSIS — M75.82 ROTATOR CUFF TENDINITIS, LEFT: ICD-10-CM

## 2025-02-18 PROCEDURE — 1036F TOBACCO NON-USER: CPT | Performed by: ORTHOPAEDIC SURGERY

## 2025-02-18 PROCEDURE — 73030 X-RAY EXAM OF SHOULDER: CPT | Mod: LEFT SIDE | Performed by: RADIOLOGY

## 2025-02-18 PROCEDURE — 99214 OFFICE O/P EST MOD 30 MIN: CPT | Performed by: ORTHOPAEDIC SURGERY

## 2025-02-18 PROCEDURE — 99214 OFFICE O/P EST MOD 30 MIN: CPT | Mod: 25 | Performed by: ORTHOPAEDIC SURGERY

## 2025-02-18 PROCEDURE — 20610 DRAIN/INJ JOINT/BURSA W/O US: CPT | Mod: LT | Performed by: ORTHOPAEDIC SURGERY

## 2025-02-18 PROCEDURE — 2500000004 HC RX 250 GENERAL PHARMACY W/ HCPCS (ALT 636 FOR OP/ED): Performed by: ORTHOPAEDIC SURGERY

## 2025-02-18 PROCEDURE — 73030 X-RAY EXAM OF SHOULDER: CPT | Mod: LT

## 2025-02-18 RX ORDER — LIDOCAINE HYDROCHLORIDE 20 MG/ML
2 INJECTION, SOLUTION INFILTRATION; PERINEURAL
Status: COMPLETED | OUTPATIENT
Start: 2025-02-18 | End: 2025-02-18

## 2025-02-18 RX ORDER — METHYLPREDNISOLONE ACETATE 40 MG/ML
40 INJECTION, SUSPENSION INTRA-ARTICULAR; INTRALESIONAL; INTRAMUSCULAR; SOFT TISSUE
Status: COMPLETED | OUTPATIENT
Start: 2025-02-18 | End: 2025-02-18

## 2025-02-18 RX ADMIN — LIDOCAINE HYDROCHLORIDE 2 ML: 20 INJECTION, SOLUTION INFILTRATION; PERINEURAL at 14:58

## 2025-02-18 RX ADMIN — METHYLPREDNISOLONE ACETATE 40 MG: 40 INJECTION, SUSPENSION INTRALESIONAL; INTRAMUSCULAR; INTRASYNOVIAL; SOFT TISSUE at 14:58

## 2025-02-18 NOTE — PROGRESS NOTES
Chief Complaint   Chief Complaint   Patient presents with    Left Shoulder - Pain         HPI:      Jennifer Padilla is a pleasant 54 y.o. year-old female who is seen today for couple month history of left shoulder pain initiated by workout she is right-hand dominant hurts at rest and with activity  No numbness or tingling no neck pain    Review of Systems all other body systems have been reviewed and are negative for complaint.    There were no vitals filed for this visit.    Past Medical History:   Diagnosis Date    Allergies     Atypical lobular hyperplasia (ALH) of breast     Hypothyroid     Lateral epicondylitis (tennis elbow)     Lobular carcinoma in situ (LCIS) of both breasts      Patient Active Problem List   Diagnosis    Allergic rhinitis    Allergies    Atypical lobular hyperplasia (ALH) of left breast    Chronic systolic (congestive) heart failure    Cough    Heart failure    History of 2019 novel coronavirus disease (COVID-19)    Hypothyroidism    Hypothyroidism due to Hashimoto's thyroiditis    Breast neoplasm, Tis (LCIS)    Lobular carcinoma in situ (LCIS) of left breast    Lobular carcinoma in situ (LCIS) of right breast    Malignant neoplasm of right breast (Multi)    NICM (nonischemic cardiomyopathy) (Multi)    Uses LifeVest defibrillator       Medication Documentation Review Audit       Reviewed by Judith Mcclain MA (Medical Assistant) on 02/18/25 at 1442      Medication Order Taking? Sig Documenting Provider Last Dose Status   azelastine (Astelin) 137 mcg (0.1 %) nasal spray [id number]   Historical Provider, MD  Active   budesonide (Pulmicort) 0.5 mg/2 mL nebulizer solution [id number]   Historical Provider, MD  Active   dapagliflozin propanediol (Farxiga) 10 mg [id number]  Take 1 tablet (10 mg) by mouth once daily with a meal. Historical Provider, MD  Active   Dupixent Syringe 300 mg/2 mL prefilled syringe [id number]   Historical Provider, MD  Active   furosemide (Lasix) 40 mg tablet [id number]    Historical Provider, MD  Active   levothyroxine (Synthroid, Levoxyl) 50 mcg tablet [id number]  TAKE 1 TABLET DAILY FOR FIVE DAYS A WEEK AND TAKE ONE AND ONE-HALF TABLETS TWO DAYS A WEEK Historical MD Lexie  Active   metoprolol succinate XL (Toprol-XL) 100 mg 24 hr tablet [id number] No Take 1 tablet (100 mg) by mouth once daily. Jose Owen MD Taking  24 2359   montelukast (Singulair) 10 mg tablet [id number]  Take 1 tablet (10 mg) by mouth once daily. Jose Owen MD  Active   rosuvastatin (Crestor) 10 mg tablet [id number] No Take 1 tablet (10 mg) by mouth once daily. Jose Owen MD Taking  23 235   sacubitriL-valsartan (Entresto) 24-26 mg tablet [id number]  Take 0.5 tablets by mouth twice a day. Historical MD Lexie  Active   spironolactone (Aldactone) 25 mg tablet [id number]  Take 1 tablet (25 mg) by mouth. Historical Provider, MD  Active                    Allergies   Allergen Reactions    Penicillins Hives and Itching       Social History     Socioeconomic History    Marital status:      Spouse name: Mac    Number of children: 1    Years of education: Not on file    Highest education level: Not on file   Occupational History    Not on file   Tobacco Use    Smoking status: Never     Passive exposure: Never    Smokeless tobacco: Never   Vaping Use    Vaping status: Never Used   Substance and Sexual Activity    Alcohol use: Not Currently    Drug use: Never    Sexual activity: Yes     Partners: Male     Birth control/protection: None   Other Topics Concern    Not on file   Social History Narrative    Not on file     Social Drivers of Health     Financial Resource Strain: Not on file   Food Insecurity: No Food Insecurity (2021)    [facility]    Hunger Vital Sign     Worried About Running Out of Food in the Last Year: Never true     Ran Out of Food in the Last Year: Never true   Transportation Needs: No  Transportation Needs (2021)    Received from [facility]    PRAPARE - Transportation     Lack of Transportation (Medical): No     Lack of Transportation (Non-Medical): No   Physical Activity: Sufficiently Active (4/10/2023)    Received from [facility]    Exercise Vital Sign     Days of Exercise per Week: 6 days     Minutes of Exercise per Session: 60 min   Stress: No Stress Concern Present (4/10/2023)    Received from [facility]    [employer] - Occupational Stress Questionnaire     Feeling of Stress : Only a little   Social Connections: Unknown (2021)    Received from [facility]    Social Connection and Isolation Panel [NHANES]     Frequency of Communication with Friends and Family: More than three times a week     Frequency of Social Gatherings with Friends and Family: Patient declined     Attends [employer] Services: Patient declined     Active Member of Clubs or Organizations: Not on file     Attends Club or Organization Meetings: More than 4 times per year     Marital Status:    Intimate Partner Violence: Not on file   Housing Stability: Low Risk  (4/10/2023)    Received from [facility]    Housing Stability Vital Sign     Unable to Pay for Housing in the Last Year: No     Number of Places Lived in the Last Year: 1     Unstable Housing in the Last Year: No       Past Surgical History:   Procedure Laterality Date    BI MAMMO GUIDED BREAST LEFT LOCALIZATION Left 2018    BI MAMMO GUIDED LOCALIZATION BREAST LEFT 2018 [facility]    BREAST LUMPECTOMY Bilateral     Left- LCIS (2018). Right- Atypia ().    OTHER SURGICAL HISTORY  10/07/2020     section    OTHER SURGICAL HISTORY  10/07/2020    Ureteroneocystostomy    OTHER SURGICAL HISTORY  10/07/2020    Lumpectomy       There is no height or weight on file to calculate  "BMI.    HgA1c:   No results found for: \"HGBA1C\", \"ENVGHDUK2B\"    Physical Exam:  Constitutional: Well-developed well-nourished   Eyes: Sclerae anicteric, pupils equal and round  HENT: Normocephalic atraumatic  Cardiovascular: Pulses full, regular rate and rhythm  Respiratory: Breathing not labored, no wheezing  Integumentary: Skin intact, no lesions or rashes  Neurological: Sensation intact, no gross strength deficits, reflexes equal  Psychiatric: Alert oriented and appropriate  Hematologic/lymphatic: No lymphadenopathy  Left shoulder: No mass swelling or deformity full range of motion mildly positive painful arc sign good cuff strength negative speeds test nontender over bicipital groove          Imaging:  X-ray of the shoulder negative        Impression/Plan:  Rotator cuff syndrome  Injected shoulder  Refer to physical therapy  Reevaluate in 6 weeks  May need further imaging  L Inj/Asp: L subacromial bursa on 2/18/2025 2:58 PM  Indications: pain  Details: 21 G needle, lateral approach  Medications: 40 mg methylPREDNISolone acetate 40 mg/mL; 2 mL lidocaine 20 mg/mL (2 %)          "

## 2025-10-22 ENCOUNTER — APPOINTMENT (OUTPATIENT)
Dept: RADIOLOGY | Facility: CLINIC | Age: 55
End: 2025-10-22
Payer: COMMERCIAL

## 2025-12-15 ENCOUNTER — APPOINTMENT (OUTPATIENT)
Dept: OBSTETRICS AND GYNECOLOGY | Facility: CLINIC | Age: 55
End: 2025-12-15
Payer: COMMERCIAL